# Patient Record
Sex: MALE | Race: OTHER | NOT HISPANIC OR LATINO | URBAN - METROPOLITAN AREA
[De-identification: names, ages, dates, MRNs, and addresses within clinical notes are randomized per-mention and may not be internally consistent; named-entity substitution may affect disease eponyms.]

---

## 2021-06-28 ENCOUNTER — OFFICE VISIT (OUTPATIENT)
Dept: FAMILY MEDICINE | Facility: CLINIC | Age: 46
End: 2021-06-28
Payer: COMMERCIAL

## 2021-06-28 VITALS
BODY MASS INDEX: 36.45 KG/M2 | HEIGHT: 78 IN | TEMPERATURE: 99 F | SYSTOLIC BLOOD PRESSURE: 149 MMHG | HEART RATE: 81 BPM | WEIGHT: 315 LBS | DIASTOLIC BLOOD PRESSURE: 96 MMHG | OXYGEN SATURATION: 99 %

## 2021-06-28 DIAGNOSIS — M25.561 CHRONIC PAIN OF BOTH KNEES: ICD-10-CM

## 2021-06-28 DIAGNOSIS — G47.30 SLEEP DISORDER BREATHING: ICD-10-CM

## 2021-06-28 DIAGNOSIS — M17.10 UNILATERAL PRIMARY OSTEOARTHRITIS, UNSPECIFIED KNEE: ICD-10-CM

## 2021-06-28 DIAGNOSIS — E78.5 HYPERLIPIDEMIA, UNSPECIFIED HYPERLIPIDEMIA TYPE: ICD-10-CM

## 2021-06-28 DIAGNOSIS — G89.29 CHRONIC PAIN OF BOTH KNEES: ICD-10-CM

## 2021-06-28 DIAGNOSIS — Z00.00 ROUTINE GENERAL MEDICAL EXAMINATION AT A HEALTH CARE FACILITY: ICD-10-CM

## 2021-06-28 DIAGNOSIS — Z87.01 HISTORY OF BACTERIAL PNEUMONIA: ICD-10-CM

## 2021-06-28 DIAGNOSIS — M54.9 DORSALGIA, UNSPECIFIED: ICD-10-CM

## 2021-06-28 DIAGNOSIS — M25.562 CHRONIC PAIN OF BOTH KNEES: ICD-10-CM

## 2021-06-28 DIAGNOSIS — M54.9 BACK PAIN, UNSPECIFIED BACK LOCATION, UNSPECIFIED BACK PAIN LATERALITY, UNSPECIFIED CHRONICITY: Primary | ICD-10-CM

## 2021-06-28 LAB
ABS NRBC COUNT: 0 X 10 3/UL (ref 0–0.01)
ABSOLUTE BASOPHIL: 0.02 X 10 3/UL (ref 0–0.22)
ABSOLUTE EOSINOPHIL: 0.2 X 10 3/UL (ref 0.04–0.54)
ABSOLUTE IMMATURE GRAN: 0.01 X 10 3/UL (ref 0–0.04)
ABSOLUTE LYMPHOCYTE: 1.57 X 10 3/UL (ref 0.86–4.75)
ABSOLUTE MONOCYTE: 0.45 X 10 3/UL (ref 0.22–1.08)
ALBUMIN SERPL-MCNC: 4.4 G/DL (ref 3.5–5.2)
ALP ISOS SERPL LEV INH-CCNC: 75 U/L (ref 40–130)
ALT (SGPT): 14 U/L (ref 0–41)
ANION GAP SERPL CALC-SCNC: 7 MMOL/L (ref 8–17)
AST SERPL-CCNC: 18 U/L (ref 0–40)
BASOPHILS NFR BLD: 0.4 % (ref 0.2–1.2)
BILIRUB CONJ+UNCONJ SERPL-MCNC: NORMAL MG/DL (ref 0.1–0.8)
BILIRUBIN DIRECT+TOT PNL SERPL-MCNC: <0.2 MG/DL (ref 0–0.3)
BILIRUBIN, TOTAL: 0.46 MG/DL (ref 0–1.2)
BUN/CREAT SERPL: 14.3 (ref 6–20)
CALCIUM SERPL-MCNC: 9.7 MG/DL (ref 8.6–10.2)
CARBON DIOXIDE, CO2: 29 MMOL/L (ref 22–29)
CHLORIDE: 102 MMOL/L (ref 98–107)
CHOLEST SERPL-MSCNC: 268 MG/DL (ref 100–200)
CREAT SERPL-MCNC: 0.84 MG/DL (ref 0.7–1.2)
EOSINOPHIL NFR BLD: 4.2 % (ref 0.7–7)
ESTIMATED AVERAGE GLUCOSE: 119 MG/DL
GFR ESTIMATION: 99.73
GLOBULIN: 3.6 G/DL (ref 1.5–4.5)
GLUCOSE: 98 MG/DL (ref 74–106)
HBA1C MFR BLD: 5.8 % (ref 4–6)
HCT VFR BLD AUTO: 47.5 % (ref 42–52)
HDLC SERPL-MCNC: 60 MG/DL
HGB BLD-MCNC: 14.7 G/DL (ref 14–18)
IMMATURE GRANULOCYTES: 0.2 % (ref 0–0.5)
LDL/HDL RATIO: 3.3 (ref 1–3)
LDLC SERPL CALC-MCNC: 195.4 MG/DL (ref 0–100)
LYMPHOCYTES NFR BLD: 32.8 % (ref 19.3–53.1)
MCH RBC QN AUTO: 24.6 PG (ref 27–32)
MCHC RBC AUTO-ENTMCNC: 30.9 G/DL (ref 32–36)
MCV RBC AUTO: 79.4 FL (ref 80–94)
MONOCYTES NFR BLD: 9.4 % (ref 4.7–12.5)
NEUTROPHILS # BLD AUTO: 2.53 X 10 3/UL (ref 2.15–7.56)
NEUTROPHILS NFR BLD: 53 % (ref 34–71.1)
NUCLEATED RED BLOOD CELLS: 0 /100 WBC (ref 0–0.2)
PLATELET # BLD AUTO: 248 X 10 3/UL (ref 135–400)
POTASSIUM: 4.6 MMOL/L (ref 3.5–5.1)
PROT SNV-MCNC: 8 G/DL (ref 6.4–8.3)
PSA, DIAGNOSTIC: 0.61 NG/ML (ref 0–4)
RBC # BLD AUTO: 5.98 X 10 6/UL (ref 4.7–6.1)
RDW-SD: 46.5 FL (ref 37–54)
SODIUM: 138 MMOL/L (ref 136–145)
TRIGL SERPL-MCNC: 63 MG/DL (ref 0–150)
TSH SERPL DL<=0.005 MIU/L-ACNC: 2.87 UIU/ML (ref 0.27–4.2)
UREA NITROGEN (BUN): 12 MG/DL (ref 6–20)
VITAMIN D (25OHD): 6.8 NG/ML
WBC # BLD: 4.78 X 10 3/UL (ref 4.3–10.8)

## 2021-06-28 PROCEDURE — 3008F PR BODY MASS INDEX (BMI) DOCUMENTED: ICD-10-PCS | Mod: CPTII,S$GLB,, | Performed by: INTERNAL MEDICINE

## 2021-06-28 PROCEDURE — 99204 PR OFFICE/OUTPT VISIT, NEW, LEVL IV, 45-59 MIN: ICD-10-PCS | Mod: S$GLB,,, | Performed by: INTERNAL MEDICINE

## 2021-06-28 PROCEDURE — 99204 OFFICE O/P NEW MOD 45 MIN: CPT | Mod: S$GLB,,, | Performed by: INTERNAL MEDICINE

## 2021-06-28 PROCEDURE — 3008F BODY MASS INDEX DOCD: CPT | Mod: CPTII,S$GLB,, | Performed by: INTERNAL MEDICINE

## 2021-06-29 ENCOUNTER — TELEPHONE (OUTPATIENT)
Dept: FAMILY MEDICINE | Facility: CLINIC | Age: 46
End: 2021-06-29

## 2021-07-07 ENCOUNTER — TELEPHONE (OUTPATIENT)
Dept: FAMILY MEDICINE | Facility: CLINIC | Age: 46
End: 2021-07-07

## 2021-07-08 ENCOUNTER — TELEPHONE (OUTPATIENT)
Dept: FAMILY MEDICINE | Facility: CLINIC | Age: 46
End: 2021-07-08

## 2021-12-06 ENCOUNTER — PATIENT MESSAGE (OUTPATIENT)
Dept: RESEARCH | Facility: HOSPITAL | Age: 46
End: 2021-12-06
Payer: COMMERCIAL

## 2022-01-31 ENCOUNTER — TELEPHONE (OUTPATIENT)
Dept: FAMILY MEDICINE | Facility: CLINIC | Age: 47
End: 2022-01-31
Payer: COMMERCIAL

## 2022-01-31 ENCOUNTER — PATIENT MESSAGE (OUTPATIENT)
Dept: FAMILY MEDICINE | Facility: CLINIC | Age: 47
End: 2022-01-31
Payer: COMMERCIAL

## 2022-01-31 NOTE — TELEPHONE ENCOUNTER
----- Message from Evelina Arango LPN sent at 1/27/2022  4:23 PM CST -----    ----- Message -----  From: Sandy Mancia  Sent: 1/27/2022   8:53 AM CST  To: [facility] Staff    Pt is calling in regards to x-rays from December. Pt also stated that is having some back pain. Please call [phone].

## 2022-03-10 ENCOUNTER — OFFICE VISIT (OUTPATIENT)
Dept: FAMILY MEDICINE | Facility: CLINIC | Age: 47
End: 2022-03-10
Payer: COMMERCIAL

## 2022-03-10 VITALS
OXYGEN SATURATION: 99 % | BODY MASS INDEX: 36.45 KG/M2 | WEIGHT: 315 LBS | TEMPERATURE: 98 F | HEIGHT: 78 IN | SYSTOLIC BLOOD PRESSURE: 129 MMHG | DIASTOLIC BLOOD PRESSURE: 76 MMHG | HEART RATE: 78 BPM

## 2022-03-10 DIAGNOSIS — E55.9 VITAMIN D DEFICIENCY: ICD-10-CM

## 2022-03-10 DIAGNOSIS — M25.562 CHRONIC PAIN OF BOTH KNEES: ICD-10-CM

## 2022-03-10 DIAGNOSIS — E78.5 HYPERLIPIDEMIA, UNSPECIFIED HYPERLIPIDEMIA TYPE: Primary | ICD-10-CM

## 2022-03-10 DIAGNOSIS — I42.9 CARDIOMYOPATHY, UNSPECIFIED TYPE: ICD-10-CM

## 2022-03-10 DIAGNOSIS — G89.29 CHRONIC PAIN OF BOTH KNEES: ICD-10-CM

## 2022-03-10 DIAGNOSIS — E66.01 MORBID OBESITY WITH BMI OF 40.0-44.9, ADULT: ICD-10-CM

## 2022-03-10 DIAGNOSIS — R73.03 PREDIABETES: ICD-10-CM

## 2022-03-10 DIAGNOSIS — M25.561 CHRONIC PAIN OF BOTH KNEES: ICD-10-CM

## 2022-03-10 PROCEDURE — 99214 OFFICE O/P EST MOD 30 MIN: CPT | Mod: S$GLB,,, | Performed by: INTERNAL MEDICINE

## 2022-03-10 PROCEDURE — 99214 PR OFFICE/OUTPT VISIT, EST, LEVL IV, 30-39 MIN: ICD-10-PCS | Mod: S$GLB,,, | Performed by: INTERNAL MEDICINE

## 2022-03-10 RX ORDER — NAPROXEN 500 MG/1
500 TABLET ORAL 2 TIMES DAILY
Qty: 60 TABLET | Refills: 6 | Status: SHIPPED | OUTPATIENT
Start: 2022-03-10 | End: 2023-01-01

## 2022-03-10 NOTE — PROGRESS NOTES
"Subjective:       Patient ID: Stacy Figueroa is a 46 y.o. male.    Chief Complaint: Follow-up (Chest xray), Back Pain, and Knee Pain      HPI: De comes in today for follow-up.  Still has that little dry cough.  I reviewed x-ray that he had in November of 2021 and it showed some cardiomegaly that could have either have represented edema or chronic interstitial disease.  He does not smoke and has never been a smoker.  He said that when he was a child he did have to see the cardiologist several times because he was passing out.  He said he was also told he had a murmur.  I really do not hear a murmur on today's exam.  I certainly do not hear any crackles or are problems in the bases.  His O2 sat today is 99%.  I discussed with him that I do want to schedule him for an echocardiogram and a referral to Dr. Mantilla to help sort through this.  He is going to start on some vitamin D3 3000 IU daily.  He has bilateral knee pain and more low back pain and he is used to having.  He has had no recent injuries or falls.  He says he takes over-the-counter Aleve with varying results.  I have told him I want him to start Naprosyn 500 mg twice a day faithfully with food.  I have asked him not to take over-the-counter Naprosyn or ibuprofen with that.  He denies chest pains or shortness of breath.  He knows his weight is an issue.  He does not describe ankle edema.  He denies PND or orthopnea.       Past Medical History: History reviewed. No pertinent past medical history.    Past Surgical Historical: History reviewed. No pertinent surgical history.     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    Cardiovascular: Negative for chest pain and palpitations.   Gastrointestinal: Negative for abdominal pain, change in bowel habit and change in bowel habit.   Musculoskeletal: Positive for back pain and neck pain.   Neurological: Negative for dizziness and syncope.        Physical Exam:  Physical Exam  Vitals reviewed.   Constitutional:       Appearance: Normal appearance.   Cardiovascular:      Rate and Rhythm: Normal rate and regular rhythm.   Pulmonary:      Effort: Pulmonary effort is normal.      Breath sounds: Normal breath sounds.   Abdominal:      General: Abdomen is flat.      Palpations: Abdomen is soft.   Skin:     General: Skin is warm and dry.      Comments: No Induration.    Neurological:      General: No focal deficit present.      Mental Status: He is alert and oriented to person, place, and time.   Psychiatric:         Mood and Affect: Mood normal.      Comments: Oriented.           Assessment/Plan:       Problem List Items Addressed This Visit        Cardiac/Vascular    Hyperlipidemia - Primary    Cardiomyopathy    Relevant Orders    Ambulatory referral/consult to Cardiology    Echo       Endocrine    Vitamin D deficiency    Prediabetes    Morbid obesity with BMI of 40.0-44.9, adult       Orthopedic    Chronic pain of both knees    Relevant Medications    naproxen (NAPROSYN) 500 MG tablet               Follow up in about 3 months (around 6/10/2022).     Mikhail Peterson

## 2022-03-10 NOTE — PATIENT INSTRUCTIONS
"We do not have an after hours answering service.  If you need medical assistance after hours or weekends and holidays, you will need to report to the nearest emergency room or urgent care.      If I have ordered any lab tests or imaging studies (Xray/MRI/CT Scan/ Ultrasound), you should receive a call with your results within  Seven business days.  If you do not , please call my office for results. I do not believe "No News is Good News" .    If you need prescription refills between appointments, please ask your pharmacist to send us a request to renew, as this is the most efficient way to get your medication refilled.  If needed, you can contact our office during business hours to request a renewal.  If your symptoms don't resolve, or for any change or worsening of your symptoms, please contact our office for a return visit to be re-evaluated. If you feel immediate help is needed, please dial 911 or go to the nearest emergency room.    "

## 2022-07-05 ENCOUNTER — OFFICE VISIT (OUTPATIENT)
Dept: FAMILY MEDICINE | Facility: CLINIC | Age: 47
End: 2022-07-05
Payer: COMMERCIAL

## 2022-07-05 VITALS
OXYGEN SATURATION: 96 % | SYSTOLIC BLOOD PRESSURE: 124 MMHG | HEIGHT: 78 IN | BODY MASS INDEX: 36.45 KG/M2 | HEART RATE: 90 BPM | DIASTOLIC BLOOD PRESSURE: 86 MMHG | WEIGHT: 315 LBS

## 2022-07-05 DIAGNOSIS — M25.561 CHRONIC PAIN OF BOTH KNEES: ICD-10-CM

## 2022-07-05 DIAGNOSIS — I42.9 CARDIOMYOPATHY, UNSPECIFIED TYPE: ICD-10-CM

## 2022-07-05 DIAGNOSIS — G89.29 CHRONIC PAIN OF BOTH KNEES: ICD-10-CM

## 2022-07-05 DIAGNOSIS — R73.03 PREDIABETES: ICD-10-CM

## 2022-07-05 DIAGNOSIS — R53.83 FATIGUE, UNSPECIFIED TYPE: ICD-10-CM

## 2022-07-05 DIAGNOSIS — M54.50 CHRONIC BILATERAL LOW BACK PAIN WITHOUT SCIATICA: Primary | ICD-10-CM

## 2022-07-05 DIAGNOSIS — E66.01 MORBID OBESITY WITH BMI OF 40.0-44.9, ADULT: ICD-10-CM

## 2022-07-05 DIAGNOSIS — E55.9 VITAMIN D DEFICIENCY: ICD-10-CM

## 2022-07-05 DIAGNOSIS — E78.5 HYPERLIPIDEMIA, UNSPECIFIED HYPERLIPIDEMIA TYPE: ICD-10-CM

## 2022-07-05 DIAGNOSIS — M54.16 LUMBAR RADICULOPATHY, CHRONIC: ICD-10-CM

## 2022-07-05 DIAGNOSIS — M25.562 CHRONIC PAIN OF BOTH KNEES: ICD-10-CM

## 2022-07-05 DIAGNOSIS — G89.29 CHRONIC BILATERAL LOW BACK PAIN WITHOUT SCIATICA: Primary | ICD-10-CM

## 2022-07-05 LAB
ABS NRBC COUNT: 0 X 10 3/UL (ref 0–0.01)
ABSOLUTE BASOPHIL: 0.03 X 10 3/UL (ref 0–0.22)
ABSOLUTE EOSINOPHIL: 0.2 X 10 3/UL (ref 0.04–0.54)
ABSOLUTE IMMATURE GRAN: 0.03 X 10 3/UL (ref 0–0.04)
ABSOLUTE LYMPHOCYTE: 1.65 X 10 3/UL (ref 0.86–4.75)
ABSOLUTE MONOCYTE: 0.59 X 10 3/UL (ref 0.22–1.08)
ALBUMIN SERPL-MCNC: 4.6 G/DL (ref 3.5–5.2)
ALP ISOS SERPL LEV INH-CCNC: 64 U/L (ref 40–130)
ALT (SGPT): 13 U/L (ref 0–41)
ANION GAP SERPL CALC-SCNC: 11 MMOL/L (ref 8–17)
AST SERPL-CCNC: 15 U/L (ref 0–40)
BASOPHILS NFR BLD: 0.4 % (ref 0.2–1.2)
BILIRUB CONJ+UNCONJ SERPL-MCNC: NORMAL MG/DL (ref 0.1–0.8)
BILIRUBIN DIRECT+TOT PNL SERPL-MCNC: <0.2 MG/DL (ref 0–0.3)
BILIRUBIN, TOTAL: 0.46 MG/DL (ref 0–1.2)
BUN/CREAT SERPL: 15.7 (ref 6–20)
CALCIUM SERPL-MCNC: 9.4 MG/DL (ref 8.6–10.2)
CARBON DIOXIDE, CO2: 24 MMOL/L (ref 22–29)
CHLORIDE: 102 MMOL/L (ref 98–107)
CHOLEST SERPL-MSCNC: 270 MG/DL (ref 100–200)
CREAT SERPL-MCNC: 0.88 MG/DL (ref 0.7–1.2)
EOSINOPHIL NFR BLD: 2.5 % (ref 0.7–7)
ESTIMATED AVERAGE GLUCOSE: 123 MG/DL
GFR ESTIMATION: 94.08
GLOBULIN: 3.1 G/DL (ref 1.5–4.5)
GLUCOSE: 91 MG/DL (ref 74–106)
HBA1C MFR BLD: 5.9 % (ref 4–6)
HCT VFR BLD AUTO: 45.6 % (ref 42–52)
HDLC SERPL-MCNC: 69 MG/DL
HGB BLD-MCNC: 14.6 G/DL (ref 14–18)
IMMATURE GRANULOCYTES: 0.4 % (ref 0–0.5)
LDL/HDL RATIO: 2.7 (ref 1–3)
LDLC SERPL CALC-MCNC: 189.6 MG/DL (ref 0–100)
LYMPHOCYTES NFR BLD: 20.9 % (ref 19.3–53.1)
MCH RBC QN AUTO: 24.9 PG (ref 27–32)
MCHC RBC AUTO-ENTMCNC: 32 G/DL (ref 32–36)
MCV RBC AUTO: 77.8 FL (ref 80–94)
MONOCYTES NFR BLD: 7.5 % (ref 4.7–12.5)
NEUTROPHILS # BLD AUTO: 5.39 X 10 3/UL (ref 2.15–7.56)
NEUTROPHILS NFR BLD: 68.3 % (ref 34–71.1)
NUCLEATED RED BLOOD CELLS: 0 /100 WBC (ref 0–0.2)
PLATELET # BLD AUTO: 203 X 10 3/UL (ref 135–400)
POTASSIUM: 4.5 MMOL/L (ref 3.5–5.1)
PROT SNV-MCNC: 7.7 G/DL (ref 6.4–8.3)
PSA, DIAGNOSTIC: 0.63 NG/ML (ref 0–4)
RBC # BLD AUTO: 5.86 X 10 6/UL (ref 4.7–6.1)
RDW-SD: 44.9 FL (ref 37–54)
SODIUM: 137 MMOL/L (ref 136–145)
TRIGL SERPL-MCNC: 57 MG/DL (ref 0–150)
TSH SERPL DL<=0.005 MIU/L-ACNC: 3.43 UIU/ML (ref 0.27–4.2)
UREA NITROGEN (BUN): 13.8 MG/DL (ref 6–20)
VITAMIN D (25OHD): 14.3 NG/ML
WBC # BLD: 7.89 X 10 3/UL (ref 4.3–10.8)

## 2022-07-05 PROCEDURE — 99214 OFFICE O/P EST MOD 30 MIN: CPT | Mod: S$GLB,,, | Performed by: INTERNAL MEDICINE

## 2022-07-05 PROCEDURE — 99214 PR OFFICE/OUTPT VISIT, EST, LEVL IV, 30-39 MIN: ICD-10-PCS | Mod: S$GLB,,, | Performed by: INTERNAL MEDICINE

## 2022-07-05 RX ORDER — GABAPENTIN 600 MG/1
600 TABLET ORAL 2 TIMES DAILY
Qty: 60 TABLET | Refills: 11 | Status: SHIPPED | OUTPATIENT
Start: 2022-07-05 | End: 2023-01-05

## 2022-07-05 NOTE — PROGRESS NOTES
"Subjective:       Patient ID: Stacy Figueroa is a 46 y.o. male.    Chief Complaint: Follow-up and Back Pain      HPI: Stacy comes in today for follow-up.  He is continuing to have low back pain in spite of taking the Naprosyn.  He does believe the Naprosyn has helped his knees more than his back.  We had wanted to images back but it was denied.  We are going to try to order it again since he is continuing to have problems I think he has a little bit of muscle weakness in his legs.  He knows that his weight is an issue.  Fortunately he does not smoke.  He did see the cardiologist at my request.  I think that this was primarily based on cardiomegaly on a chest x-ray that he had in February of this year.  He says that that a check a point well and his heart doctor told him nothing to worry about.  We are going to repeat blood work.  He will continue to work on weight loss.       Past Medical History: History reviewed. No pertinent past medical history.    Past Surgical Historical: History reviewed. No pertinent surgical history.     Vitals: /86 (BP Location: Left arm, Patient Position: Sitting, BP Method: Large (Manual))   Pulse 90   Ht 6' 6" (1.981 m)   Wt (!) 178.3 kg (393 lb 2 oz)   SpO2 96%   BMI 45.43 kg/m²      Medications:   Medication List with Changes/Refills   New Medications    GABAPENTIN (NEURONTIN) 600 MG TABLET    Take 1 tablet (600 mg total) by mouth 2 (two) times daily.   Current Medications    NAPROXEN (NAPROSYN) 500 MG TABLET    Take 1 tablet (500 mg total) by mouth 2 (two) times daily.        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    Cardiovascular: Negative for chest pain and palpitations.   Gastrointestinal: Negative for abdominal pain, change in bowel habit and change in bowel habit.   Neurological: Negative for dizziness and syncope.        Physical Exam:  Physical Exam  Vitals reviewed.   Constitutional:       Appearance: Normal appearance. He is obese.   Cardiovascular:      Rate and Rhythm: Normal rate and regular rhythm.   Pulmonary:      Effort: Pulmonary effort is normal.      Breath sounds: Normal breath sounds.   Abdominal:      General: Abdomen is flat.      Palpations: Abdomen is soft.   Skin:     General: Skin is warm and dry.      Comments: No Induration.    Neurological:      General: No focal deficit present.      Mental Status: He is alert and oriented to person, place, and time.   Psychiatric:         Mood and Affect: Mood normal.      Comments: Oriented.           Labs:  No visits with results within 6 Month(s) from this visit.   Latest known visit with results is:   Office Visit on 06/28/2021   Component Date Value Ref Range Status    PSA Diagnostic 06/28/2021 0.611  0.00 - 4.00 ng/mL Final    Glucose 06/28/2021 98  74 - 106 mg/dL Final    BUN 06/28/2021 12.0  6 - 20 mg/dL Final    Creatinine 06/28/2021 0.84  0.70 - 1.20 mg/dL Final    Calcium 06/28/2021 9.7  8.6 - 10.2 mg/dL Final    Sodium 06/28/2021 138  136 - 145 mmol/L Final    Potassium 06/28/2021 4.6  3.5 - 5.1 mmol/L Final    Chloride 06/28/2021 102  98 - 107 mmol/L Final    CO2 06/28/2021 29  22 - 29 mmol/L Final    BUN/Creatinine Ratio 06/28/2021 14.3  6 - 20 Final    GFR ESTIMATION 06/28/2021 99.73  >60.00 Final    Anion Gap 06/28/2021 7.0 (A) 8.0 - 17.0 mmol/L Final    WBC 06/28/2021  4.78  4.3 - 10.8 X 10 3/ul Final    RBC 06/28/2021 5.98  4.7 - 6.1 X 10 6/ul Final    RDW-SD 06/28/2021 46.5  37 - 54 fl Final    Hemoglobin 06/28/2021 14.7  14 - 18 g/dL Final    Hematocrit 06/28/2021 47.5  42 - 52 % Final    MCV 06/28/2021 79.4 (A) 80 - 94 fl Final    MCH 06/28/2021 24.6 (A) 27 - 32 pg Final    MCHC 06/28/2021 30.9 (A) 32 - 36 g/dL Final    Platelets 06/28/2021 248  135 - 400 X 10 3/ul Final    Neutrophils 06/28/2021 53.0  34 - 71.1 % Final    Lymphocytes 06/28/2021 32.8  19.3 - 53.1 % Final    Monocytes 06/28/2021 9.4  4.7 - 12.5 % Final    Eosinophils 06/28/2021 4.2  0.7 - 7.0 % Final    Basophils 06/28/2021 0.4  0.2 - 1.2 % Final    Neutrophils Absolute 06/28/2021 2.53  2.15 - 7.56 X 10 3/ul Final    Lymphocytes Absolute 06/28/2021 1.57  0.86 - 4.75 X 10 3/ul Final    Monocytes Absolute 06/28/2021 0.45  0.22 - 1.08 X 10 3/ul Final    Eosinophils Absolute 06/28/2021 0.20  0.04 - 0.54 X 10 3/ul Final    Basophils Absolute 06/28/2021 0.02  0.00 - 0.22 X 10 3/ul Final    Immature Granulocytes Absolute 06/28/2021 0.01  0 - 0.04 X 10 3/ul Final    Immature Granulocytes 06/28/2021 0.2  0 - 0.5 % Final    nRBC# 06/28/2021 0.0  0 - 0.2 /100 WBC Final    nRBC Count Absolute 06/28/2021 0.000  0 - 0.012 x 10 3/ul Final    Hemoglobin A1C 06/28/2021 5.8  4.0 - 6.0 % Final    EST AVERAGE GLUCOSE 06/28/2021 119 (A) NORMAL MG/DL Final    AST 06/28/2021 18  0 - 40 U/L Final    ALT (SGPT) 06/28/2021 14  0 - 41 U/L Final    Alkaline Phosphatase 06/28/2021 75  40 - 130 U/L Final    Protein, Total 06/28/2021 8.0  6.4 - 8.3 g/dL Final    Albumin 06/28/2021 4.4  3.5 - 5.2 g/dL Final    BILIRUBIN, TOTAL 06/28/2021 0.46  0.00 - 1.20 mg/dL Final    Bilirubin, Direct 06/28/2021 <0.20  0.00 - 0.30 mg/dL Final    Globulin 06/28/2021 3.6  1.5 - 4.5 g/dL Final    Bilirubin, Indirect 06/28/2021 Unable to Calculate  0.10 - 0.80 mg/dL Final    TSH 06/28/2021 2.87  0.27 - 4.20 uIU/mL Final     Cholesterol 06/28/2021 268 (A) 100 - 200 mg/dL Final    Triglycerides 06/28/2021 63  0 - 150 mg/dL Final    HDL 06/28/2021 60  >60 mg/dL Final    LDL Cholesterol 06/28/2021 195.4 (A) 0 - 100 mg/dL Final    LDL/HDL Ratio 06/28/2021 3.3 (A) 1 - 3 Final    VITAMIN D (25OHD) 06/28/2021 6.80 (A) >30 ng/mL Final        Assessment/Plan:       Problem List Items Addressed This Visit        Neuro    Lumbar radiculopathy, chronic    Relevant Orders    MRI Lumbar Spine Without Contrast       Cardiac/Vascular    Hyperlipidemia    Relevant Orders    Lipid Panel    Cardiomyopathy       Endocrine    Vitamin D deficiency    Relevant Orders    Vitamin D    Prediabetes    Relevant Orders    Hemoglobin A1C    Morbid obesity with BMI of 40.0-44.9, adult       Orthopedic    Chronic pain of both knees    Chronic bilateral low back pain without sciatica - Primary    Relevant Medications    gabapentin (NEURONTIN) 600 MG tablet      Other Visit Diagnoses     Fatigue, unspecified type        Relevant Orders    Basic Metabolic Panel    CBC Auto Differential    Hepatic Function Panel    TSH    Prostate Specific Antigen, Diagnostic               Follow up in about 6 months (around 1/5/2023).     Mikhail Peterson

## 2022-07-06 ENCOUNTER — TELEPHONE (OUTPATIENT)
Dept: FAMILY MEDICINE | Facility: CLINIC | Age: 47
End: 2022-07-06
Payer: COMMERCIAL

## 2022-07-06 NOTE — TELEPHONE ENCOUNTER
Informed patient of his Vitamin levels and that he needs to increase his current dose of vitamin D. Patient verbalized understanding.

## 2022-07-06 NOTE — TELEPHONE ENCOUNTER
----- Message from Mikhail Peterson MD sent at 7/5/2022  4:53 PM CDT -----  Pre diabetes cholesterol and triglycerides are elevated vitamin-D is low what ever vitamin-D he is taking he needs to double it.

## 2022-12-30 ENCOUNTER — PATIENT MESSAGE (OUTPATIENT)
Dept: ADMINISTRATIVE | Facility: HOSPITAL | Age: 47
End: 2022-12-30
Payer: COMMERCIAL

## 2023-01-01 ENCOUNTER — OFFICE VISIT (OUTPATIENT)
Dept: FAMILY MEDICINE | Facility: CLINIC | Age: 48
End: 2023-01-01
Payer: COMMERCIAL

## 2023-01-01 ENCOUNTER — TELEPHONE (OUTPATIENT)
Dept: FAMILY MEDICINE | Facility: CLINIC | Age: 48
End: 2023-01-01
Payer: COMMERCIAL

## 2023-01-01 VITALS
OXYGEN SATURATION: 98 % | DIASTOLIC BLOOD PRESSURE: 86 MMHG | TEMPERATURE: 97 F | HEIGHT: 78 IN | RESPIRATION RATE: 15 BRPM | WEIGHT: 315 LBS | SYSTOLIC BLOOD PRESSURE: 130 MMHG | BODY MASS INDEX: 36.45 KG/M2 | HEART RATE: 110 BPM

## 2023-01-01 DIAGNOSIS — E78.5 HYPERLIPIDEMIA, UNSPECIFIED HYPERLIPIDEMIA TYPE: ICD-10-CM

## 2023-01-01 DIAGNOSIS — G89.29 CHRONIC BILATERAL LOW BACK PAIN WITH RIGHT-SIDED SCIATICA: ICD-10-CM

## 2023-01-01 DIAGNOSIS — M54.41 CHRONIC BILATERAL LOW BACK PAIN WITH RIGHT-SIDED SCIATICA: ICD-10-CM

## 2023-01-01 DIAGNOSIS — R73.03 PREDIABETES: ICD-10-CM

## 2023-01-01 DIAGNOSIS — E55.9 VITAMIN D DEFICIENCY: ICD-10-CM

## 2023-01-01 DIAGNOSIS — Z76.89 ENCOUNTER TO ESTABLISH CARE: Primary | ICD-10-CM

## 2023-01-01 DIAGNOSIS — Z12.11 COLON CANCER SCREENING: ICD-10-CM

## 2023-01-01 LAB
25(OH)D3 SERPL-MCNC: 27 NG/ML
ABS NRBC COUNT: 0 THOU/UL (ref 0–0.01)
ABSOLUTE BASOPHIL: 0 10*3/UL (ref 0–0.3)
ABSOLUTE EOSINOPHIL: 0.3 10*3/UL (ref 0–0.6)
ABSOLUTE IMMATURE GRAN: 0.03 THOU/UL (ref 0–0.03)
ABSOLUTE LYMPHOCYTE: 1.9 10*3/UL (ref 1.2–4)
ABSOLUTE MONOCYTE: 0.5 10*3/UL (ref 0.1–0.8)
ALBUMIN SERPL BCP-MCNC: 4 G/DL (ref 3.4–5)
ALP SERPL-CCNC: 64 U/L (ref 45–117)
ALT SERPL W P-5'-P-CCNC: 26 U/L (ref 16–61)
ANION GAP SERPL CALC-SCNC: 5 MMOL/L (ref 3–11)
AST SERPL-CCNC: 19 U/L (ref 15–37)
BASOPHILS NFR BLD: 0.5 % (ref 0–3)
BILIRUB SERPL-MCNC: 1 MG/DL (ref 0.2–1)
BUN SERPL-MCNC: 15 MG/DL (ref 7–18)
BUN/CREAT SERPL: 13.39 RATIO
CALCIUM SERPL-MCNC: 9.3 MG/DL (ref 8.5–10.1)
CHLORIDE SERPL-SCNC: 104 MMOL/L (ref 98–107)
CHOLEST SERPL-MSCNC: 262 MG/DL
CO2 SERPL-SCNC: 26 MMOL/L (ref 21–32)
CREAT SERPL-MCNC: 1.12 MG/DL (ref 0.7–1.3)
EOSINOPHIL NFR BLD: 5.5 % (ref 0–6)
ERYTHROCYTE [DISTWIDTH] IN BLOOD BY AUTOMATED COUNT: 16.9 % (ref 0–15.5)
ESTIMATED AVG GLUCOSE: 136 MG/DL
GFR ESTIMATION: > 60
GLUCOSE SERPL-MCNC: 91 MG/DL (ref 74–106)
HBA1C MFR BLD: 6 % (ref 4.2–6.3)
HCT VFR BLD AUTO: 44.6 % (ref 42–52)
HDLC SERPL-MCNC: 68 MG/DL
HGB BLD-MCNC: 13.3 G/DL (ref 14–18)
IMMATURE GRANULOCYTES: 0.5 % (ref 0–0.43)
LDLC SERPL CALC-MCNC: 185.2 MG/DL
LYMPHOCYTES NFR BLD: 33.9 % (ref 20–45)
MCH RBC QN AUTO: 23.4 PG (ref 27–32)
MCHC RBC AUTO-ENTMCNC: 29.8 % (ref 32–36)
MCV RBC AUTO: 78.4 FL (ref 80–97)
MONOCYTES NFR BLD: 8 % (ref 2–10)
NEUTROPHILS # BLD AUTO: 2.9 10*3/UL (ref 1.4–7)
NEUTROPHILS NFR BLD: 51.6 % (ref 50–80)
NUCLEATED RED BLOOD CELLS: 0 % (ref 0–0.2)
PLATELETS: 221 10*3/UL (ref 130–400)
PMV BLD AUTO: 11.5 FL (ref 9.2–12.2)
POTASSIUM SERPL-SCNC: 4.2 MMOL/L (ref 3.5–5.1)
PROT SERPL-MCNC: 9.1 G/DL (ref 6.4–8.2)
PSA: 0.7 NG/ML (ref 0.1–4)
RBC # BLD AUTO: 5.69 10*6/UL (ref 4.7–6.1)
SODIUM BLD-SCNC: 135 MMOL/L (ref 131–143)
T4 FREE SP9 P CHAL SERPL-SCNC: 0.85 NG/DL (ref 0.76–1.46)
TRIGL SERPL-MCNC: 44 MG/DL (ref 0–149)
TSH SERPL DL<=0.005 MIU/L-ACNC: 2.45 UIU/ML (ref 0.36–3.74)
VLDL CHOLESTEROL: 9 MG/DL
WBC # BLD: 5.6 10*3/UL (ref 4.5–10)

## 2023-01-01 PROCEDURE — 99396 PR PREVENTIVE VISIT,EST,40-64: ICD-10-PCS | Mod: S$GLB,,, | Performed by: INTERNAL MEDICINE

## 2023-01-01 PROCEDURE — 3079F PR MOST RECENT DIASTOLIC BLOOD PRESSURE 80-89 MM HG: ICD-10-PCS | Mod: CPTII,S$GLB,, | Performed by: INTERNAL MEDICINE

## 2023-01-01 PROCEDURE — 3075F SYST BP GE 130 - 139MM HG: CPT | Mod: CPTII,S$GLB,, | Performed by: INTERNAL MEDICINE

## 2023-01-01 PROCEDURE — 3079F DIAST BP 80-89 MM HG: CPT | Mod: CPTII,S$GLB,, | Performed by: INTERNAL MEDICINE

## 2023-01-01 PROCEDURE — 1159F PR MEDICATION LIST DOCUMENTED IN MEDICAL RECORD: ICD-10-PCS | Mod: CPTII,S$GLB,, | Performed by: INTERNAL MEDICINE

## 2023-01-01 PROCEDURE — 1160F RVW MEDS BY RX/DR IN RCRD: CPT | Mod: CPTII,S$GLB,, | Performed by: INTERNAL MEDICINE

## 2023-01-01 PROCEDURE — 99396 PREV VISIT EST AGE 40-64: CPT | Mod: S$GLB,,, | Performed by: INTERNAL MEDICINE

## 2023-01-01 PROCEDURE — 3008F BODY MASS INDEX DOCD: CPT | Mod: CPTII,S$GLB,, | Performed by: INTERNAL MEDICINE

## 2023-01-01 PROCEDURE — 1160F PR REVIEW ALL MEDS BY PRESCRIBER/CLIN PHARMACIST DOCUMENTED: ICD-10-PCS | Mod: CPTII,S$GLB,, | Performed by: INTERNAL MEDICINE

## 2023-01-01 PROCEDURE — 3008F PR BODY MASS INDEX (BMI) DOCUMENTED: ICD-10-PCS | Mod: CPTII,S$GLB,, | Performed by: INTERNAL MEDICINE

## 2023-01-01 PROCEDURE — 1159F MED LIST DOCD IN RCRD: CPT | Mod: CPTII,S$GLB,, | Performed by: INTERNAL MEDICINE

## 2023-01-01 PROCEDURE — 3075F PR MOST RECENT SYSTOLIC BLOOD PRESS GE 130-139MM HG: ICD-10-PCS | Mod: CPTII,S$GLB,, | Performed by: INTERNAL MEDICINE

## 2023-01-01 RX ORDER — GABAPENTIN 600 MG/1
600 TABLET ORAL 2 TIMES DAILY PRN
Qty: 60 TABLET | Refills: 3 | Status: SHIPPED | OUTPATIENT
Start: 2023-01-01 | End: 2024-01-01 | Stop reason: SDUPTHER

## 2023-01-05 ENCOUNTER — OFFICE VISIT (OUTPATIENT)
Dept: FAMILY MEDICINE | Facility: CLINIC | Age: 48
End: 2023-01-05
Payer: COMMERCIAL

## 2023-01-05 VITALS
HEIGHT: 78 IN | WEIGHT: 315 LBS | TEMPERATURE: 98 F | BODY MASS INDEX: 36.45 KG/M2 | HEART RATE: 68 BPM | OXYGEN SATURATION: 98 %

## 2023-01-05 DIAGNOSIS — E66.01 MORBID OBESITY WITH BMI OF 40.0-44.9, ADULT: ICD-10-CM

## 2023-01-05 DIAGNOSIS — I42.9 CARDIOMYOPATHY, UNSPECIFIED TYPE: ICD-10-CM

## 2023-01-05 DIAGNOSIS — R05.1 ACUTE COUGH: ICD-10-CM

## 2023-01-05 DIAGNOSIS — R05.1 ACUTE COUGH: Primary | ICD-10-CM

## 2023-01-05 DIAGNOSIS — N52.9 ERECTILE DYSFUNCTION, UNSPECIFIED ERECTILE DYSFUNCTION TYPE: ICD-10-CM

## 2023-01-05 PROCEDURE — 3008F BODY MASS INDEX DOCD: CPT | Mod: CPTII,S$GLB,, | Performed by: INTERNAL MEDICINE

## 2023-01-05 PROCEDURE — 99214 OFFICE O/P EST MOD 30 MIN: CPT | Mod: S$GLB,,, | Performed by: INTERNAL MEDICINE

## 2023-01-05 PROCEDURE — 3008F PR BODY MASS INDEX (BMI) DOCUMENTED: ICD-10-PCS | Mod: CPTII,S$GLB,, | Performed by: INTERNAL MEDICINE

## 2023-01-05 PROCEDURE — 1159F PR MEDICATION LIST DOCUMENTED IN MEDICAL RECORD: ICD-10-PCS | Mod: CPTII,S$GLB,, | Performed by: INTERNAL MEDICINE

## 2023-01-05 PROCEDURE — 1160F PR REVIEW ALL MEDS BY PRESCRIBER/CLIN PHARMACIST DOCUMENTED: ICD-10-PCS | Mod: CPTII,S$GLB,, | Performed by: INTERNAL MEDICINE

## 2023-01-05 PROCEDURE — 1159F MED LIST DOCD IN RCRD: CPT | Mod: CPTII,S$GLB,, | Performed by: INTERNAL MEDICINE

## 2023-01-05 PROCEDURE — 99214 PR OFFICE/OUTPT VISIT, EST, LEVL IV, 30-39 MIN: ICD-10-PCS | Mod: S$GLB,,, | Performed by: INTERNAL MEDICINE

## 2023-01-05 PROCEDURE — 1160F RVW MEDS BY RX/DR IN RCRD: CPT | Mod: CPTII,S$GLB,, | Performed by: INTERNAL MEDICINE

## 2023-01-05 RX ORDER — OSELTAMIVIR PHOSPHATE 75 MG/1
75 CAPSULE ORAL DAILY
COMMUNITY
Start: 2022-12-31 | End: 2023-01-01

## 2023-01-05 RX ORDER — PROMETHAZINE HYDROCHLORIDE, PHENYLEPHRINE HYDROCHLORIDE AND CODEINE PHOSPHATE 6.25; 5; 1 MG/5ML; MG/5ML; MG/5ML
5 SOLUTION ORAL 4 TIMES DAILY
Qty: 180 ML | Refills: 0 | Status: SHIPPED | OUTPATIENT
Start: 2023-01-05 | End: 2023-01-09 | Stop reason: SDUPTHER

## 2023-01-05 RX ORDER — ALBUTEROL SULFATE 1.25 MG/3ML
1.25 SOLUTION RESPIRATORY (INHALATION)
Status: DISCONTINUED | OUTPATIENT
Start: 2023-01-05 | End: 2023-01-01

## 2023-01-05 NOTE — PROGRESS NOTES
"Subjective:       Patient ID: Stacy Figueroa is a 47 y.o. male.    Chief Complaint: Follow-up and Cough      HPI: Stacy comes in today for an appointment.  He started 1 week ago with sore throat and cough.  He has headaches and muscle aches.  He is had some fever.  He is had some diarrhea they did a COVID test a proximally 2 days into the symptoms and it was negative.  They had a family member that called out Tamiflu for him and he is almost completed that.  We are going to retest for COVID while he is here and get him a respiratory treatment.  We will also give him some to treat his cough.  I do not think antibiotics are going to help since his mucus is white.  He did not feel whole lot different after the respiratory treatment and he is got good flow of air so were not going to give him an inhaler at this time.  He had some concerns about the gabapentin possibly causing ED.  I told him that I think it is reasonable for him to give it a try, and that if it does cause some problems he can stop the gabapentin and let us know.  Will make a decision on this.  He is getting some benefit from the Naprosyn.       Past Medical History: History reviewed. No pertinent past medical history.    Past Surgical Historical: History reviewed. No pertinent surgical history.     Vitals: Pulse 68   Temp 97.9 °F (36.6 °C) (Temporal)   Ht 6' 6" (1.981 m)   Wt (!) 183.3 kg (404 lb)   SpO2 98%   BMI 46.69 kg/m²      Medications:   Medication List with Changes/Refills   New Medications    PHENYLEPH-PROMETHAZINE-COD 5-6.25-10 MG/5 ML (PROMETHAZINE VC-CODEINE) 6.25-5-10 MG/5 ML SYRP    Take 5 mLs by mouth 4 (four) times daily. As needed for cough for 10 days   Current Medications    NAPROXEN (NAPROSYN) 500 MG TABLET    Take 1 tablet (500 mg total) by mouth 2 (two) times daily.    OSELTAMIVIR (TAMIFLU) 75 MG CAPSULE    Take 75 mg by mouth once daily.   Discontinued Medications    GABAPENTIN (NEURONTIN) 600 MG TABLET    Take 1 tablet " (600 mg total) by mouth 2 (two) times daily.        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    Cardiovascular:  Negative for chest pain and palpitations.   Gastrointestinal:  Negative for abdominal pain, change in bowel habit and change in bowel habit.   Neurological:  Negative for dizziness and syncope.      Physical Exam:  Physical Exam  Vitals reviewed.   Constitutional:       Appearance: Normal appearance.   Cardiovascular:      Rate and Rhythm: Normal rate and regular rhythm.   Pulmonary:      Effort: Pulmonary effort is normal. No respiratory distress.      Breath sounds: No stridor. Wheezing present. No rhonchi or rales.   Abdominal:      General: Abdomen is flat.      Palpations: Abdomen is soft.   Skin:     General: Skin is warm and dry.      Comments: No Induration.    Neurological:      General: No focal deficit present.      Mental Status: He is alert and oriented to person, place, and time.   Psychiatric:         Mood and Affect: Mood normal.      Comments: Oriented.         Labs:  No visits with results within 6 Month(s) from this visit.   Latest known visit with results is:   Office Visit on 07/05/2022   Component Date Value Ref Range Status    Glucose 07/05/2022 91  74 - 106 mg/dL Final    BUN 07/05/2022 13.8  6 - 20 mg/dL Final    Creatinine 07/05/2022 0.88  0.70 - 1.20 mg/dL Final    Calcium 07/05/2022 9.4  8.6 -  10.2 mg/dL Final    Sodium 07/05/2022 137  136 - 145 mmol/L Final    Potassium 07/05/2022 4.5  3.5 - 5.1 mmol/L Final    Chloride 07/05/2022 102  98 - 107 mmol/L Final    CO2 07/05/2022 24  22 - 29 mmol/L Final    BUN/Creatinine Ratio 07/05/2022 15.7  6 - 20 Final    GFR ESTIMATION 07/05/2022 94.08  >60.00 Final    Anion Gap 07/05/2022 11.0  8.0 - 17.0 mmol/L Final    WBC 07/05/2022 7.89  4.3 - 10.8 X 10 3/ul Final    RBC 07/05/2022 5.86  4.7 - 6.1 X 10 6/ul Final    RDW-SD 07/05/2022 44.9  37 - 54 fl Final    Hemoglobin 07/05/2022 14.6  14 - 18 g/dL Final    Hematocrit 07/05/2022 45.6  42 - 52 % Final    MCV 07/05/2022 77.8 (L)  80 - 94 fl Final    MCH 07/05/2022 24.9 (L)  27 - 32 pg Final    MCHC 07/05/2022 32.0  32 - 36 g/dL Final    Platelets 07/05/2022 203  135 - 400 X 10 3/ul Final    Neutrophils 07/05/2022 68.3  34 - 71.1 % Final    Lymphocytes 07/05/2022 20.9  19.3 - 53.1 % Final    Monocytes 07/05/2022 7.5  4.7 - 12.5 % Final    Eosinophils 07/05/2022 2.5  0.7 - 7.0 % Final    Basophils 07/05/2022 0.4  0.2 - 1.2 % Final    Neutrophils Absolute 07/05/2022 5.39  2.15 - 7.56 X 10 3/ul Final    Lymphocytes Absolute 07/05/2022 1.65  0.86 - 4.75 X 10 3/ul Final    Monocytes Absolute 07/05/2022 0.59  0.22 - 1.08 X 10 3/ul Final    Eosinophils Absolute 07/05/2022 0.20  0.04 - 0.54 X 10 3/ul Final    Basophils Absolute 07/05/2022 0.03  0.00 - 0.22 X 10 3/ul Final    Immature Granulocytes Absolute 07/05/2022 0.03  0 - 0.04 X 10 3/ul Final    Immature Granulocytes 07/05/2022 0.4  0 - 0.5 % Final    nRBC# 07/05/2022 0.0  0 - 0.2 /100 WBC Final    nRBC Count Absolute 07/05/2022 0.000  0 - 0.012 x 10 3/ul Final    Hemoglobin A1C 07/05/2022 5.9  4.0 - 6.0 % Final    EST AVERAGE GLUCOSE 07/05/2022 123 (H)  NORMAL MG/DL Final    AST 07/05/2022 15  0 - 40 U/L Final    ALT (SGPT) 07/05/2022 13  0 - 41 U/L Final    Alkaline Phosphatase 07/05/2022 64  40 - 130 U/L Final    Protein, Total 07/05/2022 7.7  6.4 - 8.3 g/dL Final     Albumin 07/05/2022 4.6  3.5 - 5.2 g/dL Final    BILIRUBIN, TOTAL 07/05/2022 0.46  0.00 - 1.20 mg/dL Final    Bilirubin, Direct 07/05/2022 <0.20  0.00 - 0.30 mg/dL Final    Globulin 07/05/2022 3.1  1.5 - 4.5 g/dL Final    Bilirubin, Indirect 07/05/2022 Unable to Calculate  0.10 - 0.80 mg/dL Final    TSH 07/05/2022 3.43  0.27 - 4.20 uIU/mL Final    Cholesterol 07/05/2022 270 (H)  100 - 200 mg/dL Final    Triglycerides 07/05/2022 57  0 - 150 mg/dL Final    HDL 07/05/2022 69  >60 mg/dL Final    LDL Cholesterol 07/05/2022 189.6 (H)  0 - 100 mg/dL Final    LDL/HDL Ratio 07/05/2022 2.7  1 - 3 Final    VITAMIN D (25OHD) 07/05/2022 14.3 (L)  >30 ng/mL Final    PSA Diagnostic 07/05/2022 0.628  0.00 - 4.00 ng/mL Final        Assessment/Plan:       Problem List Items Addressed This Visit          Cardiac/Vascular    Cardiomyopathy       Endocrine    Morbid obesity with BMI of 40.0-44.9, adult     Other Visit Diagnoses       Acute cough    -  Primary    Relevant Medications    albuterol nebulizer solution 1.25 mg (Start on 1/5/2023  3:15 PM)    phenyleph-promethazine-cod 5-6.25-10 mg/5 ml (PROMETHAZINE VC-CODEINE) 6.25-5-10 mg/5 mL Syrp    Erectile dysfunction, unspecified erectile dysfunction type                     Follow up in about 6 months (around 7/5/2023).     Mikhail Peterson

## 2023-01-06 RX ORDER — PHENYLEPHRINE HCL/PROMETH HCL 5-6.25MG/5
SYRUP ORAL
Qty: 180 ML | Refills: 0 | OUTPATIENT
Start: 2023-01-06

## 2023-01-09 DIAGNOSIS — R05.1 ACUTE COUGH: ICD-10-CM

## 2023-01-09 RX ORDER — PROMETHAZINE HYDROCHLORIDE, PHENYLEPHRINE HYDROCHLORIDE AND CODEINE PHOSPHATE 6.25; 5; 1 MG/5ML; MG/5ML; MG/5ML
5 SOLUTION ORAL 4 TIMES DAILY
Qty: 180 ML | Refills: 0 | Status: SHIPPED | OUTPATIENT
Start: 2023-01-09 | End: 2023-01-19

## 2023-01-09 NOTE — TELEPHONE ENCOUNTER
----- Message from Shruthi Resendez sent at 1/9/2023 12:16 PM CST -----  Regarding: Medication  Contact: patient  .Type:  RX Refill Request    Who Called: Travrd  Refill or New Rx:Refill  RX Name and Strength:phenyleph-promethazine-cod 5-6.25-10 mg/5 ml (PROMETHAZINE VC-CODEINE) 6.25-5-10 mg/5 mL Syrp  How is the patient currently taking it? (ex. 1XDay):as needed  Is this a 30 day or 90 day RX:n/a  Preferred Pharmacy with phone number:    [id number] - [place], [place] - [address] [place] [address] [id number] [facility] [id number]  Phone: [phone] Fax: [phone]      Local or Mail Order:local  Ordering Provider:Mikhail Peterson  Would the patient rather a call back or a response via [facility]? My [facility]  Best Call Back Number:[phone]    Additional Information: The medication was not available at the pharmacy it was sent to, needs location changed.

## 2023-07-05 PROBLEM — M54.41 CHRONIC BILATERAL LOW BACK PAIN WITH RIGHT-SIDED SCIATICA: Status: ACTIVE | Noted: 2022-07-05

## 2023-07-05 NOTE — PROGRESS NOTES
"  Subjective:       Patient ID: Stacy Figueroa is a 47 y.o. male.    Chief Complaint: Establish Care (Former pt. Of Dr. Parra to get est.  C/o back pain and Rt. leg pain between back and knee x 1.5yrs.  Dr. Parra dx it as "arthritis" per pt.  )    HPI    47 y.o. male here to establish care.         Health Maintenance Topics with due status: Not Due       Topic Last Completion Date    Lipid Panel 07/05/2022    Influenza Vaccine Not Due       Health Maintenance Due   Topic Date Due    Hepatitis C Screening  Never done    Pneumococcal Vaccines (Age 0-64) (1 - PCV) Never done    HIV Screening  Never done    TETANUS VACCINE  09/03/2022    COVID-19 Vaccine (4 - Pfizer series) 09/29/2022    Colorectal Cancer Screening  Never done    Hemoglobin A1c (Prediabetes)  07/05/2023         Medical Problems:      Patient Active Problem List   Diagnosis    Vitamin D deficiency    Hyperlipidemia    Prediabetes    Morbid obesity with BMI of 40.0-44.9, adult    Cardiomyopathy    Chronic pain of both knees    Lumbar radiculopathy, chronic    Chronic bilateral low back pain with right-sided sciatica       Current Outpatient Medications on File Prior to Visit   Medication Sig Dispense Refill    [DISCONTINUED] naproxen (NAPROSYN) 500 MG tablet Take 1 tablet (500 mg total) by mouth 2 (two) times daily. (Patient not taking: Reported on 7/5/2023) 60 tablet 6    [DISCONTINUED] oseltamivir (TAMIFLU) 75 MG capsule Take 75 mg by mouth once daily.       Current Facility-Administered Medications on File Prior to Visit   Medication Dose Route Frequency Provider Last Rate Last Admin    [DISCONTINUED] albuterol nebulizer solution 1.25 mg  1.25 mg Nebulization 1 time in Clinic/HOD Mikhail Peterson MD               History reviewed. No pertinent past medical history.  History reviewed. No pertinent surgical history.  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        Review of Systems   Constitutional:  Negative for diaphoresis and fever.   HENT:  Negative for trouble swallowing.    Eyes:  Negative for discharge.   Respiratory:  Negative for cough and shortness of breath.    Cardiovascular:  Negative for chest pain and leg swelling.   Gastrointestinal:  Negative for abdominal pain and blood in stool.   Genitourinary:  Negative for difficulty urinating and dysuria.   Musculoskeletal:  Positive for arthralgias and back pain. Negative for gait problem.   Skin:  Negative for pallor.   Neurological:  Negative for syncope and weakness.     Objective:        Vitals:    07/05/23 1337   BP: 130/86   BP Location: Left arm   Patient Position: Sitting   BP Method: Large (Manual)   Pulse: 110   Resp: 15   Temp: 97.3 °F (36.3 °C)   TempSrc: Temporal   SpO2: 98%   Weight: (!) 178.4 kg (393 lb 3.2 oz)   Height: 6' 6" (1.981 m)       Body mass index is 45.44 kg/m².    Physical Exam  Constitutional:       General: He is not in acute distress.     Appearance: He is well-developed. He is not diaphoretic.   HENT:      Head: Normocephalic and atraumatic.      Right Ear: External ear normal.      Left Ear: External ear normal.   Eyes:      Conjunctiva/sclera: Conjunctivae normal.   Cardiovascular:      Rate and Rhythm: Normal rate and regular rhythm. "   Pulmonary:      Effort: Pulmonary effort is normal.      Breath sounds: Normal breath sounds.   Abdominal:      General: Bowel sounds are normal.      Palpations: Abdomen is soft.   Musculoskeletal:      Cervical back: Neck supple.      Right lower leg: No edema.      Left lower leg: No edema.   Skin:     General: Skin is warm and dry.      Nails: There is no clubbing.   Neurological:      General: No focal deficit present.      Mental Status: He is alert.      Gait: Gait normal.   Psychiatric:         Behavior: Behavior normal.         Judgment: Judgment normal.       Assessment:     1. Encounter to establish care    2. Colon cancer screening    3. Prediabetes    4. Hyperlipidemia, unspecified hyperlipidemia type    5. Vitamin D deficiency    6. Chronic bilateral low back pain with right-sided sciatica           Plan:         1. Encounter to establish care  - reviewed healthcare maintenance and pt's chronic medical problems.   - labs reviewed, ordered  - immunizations reviewed  - CRC screen - due   - CBC Auto Differential  - Comprehensive Metabolic Panel  - Hemoglobin A1C  - Lipid Panel  - TSH  - T4, Free  - Vitamin D  - PSA, Screening    2. Colon cancer screening    - Cologuard Screening (Multitarget Stool DNA); Future  - Cologuard Screening (Multitarget Stool DNA)    3. Prediabetes    - Hemoglobin A1C    4. Hyperlipidemia, unspecified hyperlipidemia type    - Lipid Panel    5. Vitamin D deficiency    - Vitamin D    6. Chronic bilateral low back pain with right-sided sciatica  - refill prn med which works well for him without adverse effect  - gabapentin (NEURONTIN) 600 MG tablet; Take 1 tablet (600 mg total) by mouth 2 (two) times daily as needed (pain).  Dispense: 60 tablet; Refill: 3                Unless there are intervening problems, Follow up in about 6 months (around 1/5/2024), or if symptoms worsen or fail to improve, for follow up.      Patient note was created using MModal Dictation.  Any errors in  syntax or even information may not have been identified and edited on initial review prior to signing this note.

## 2023-07-11 NOTE — PROGRESS NOTES
Results released to patient portal.    The 10-year ASCVD risk score (Yamilka DENNIS, et al., 2019) is: 6%    Values used to calculate the score:      Age: 47 years      Sex: Male      Is Non- : No      Diabetic: No      Tobacco smoker: Yes      Systolic Blood Pressure: 130 mmHg      Is BP treated: No      HDL Cholesterol: 68 mg/dL      Total Cholesterol: 262 mg/dL       Your results have been reviewed.  The A1c is stable  in the pre-diabetes range. It is important to eat a healthy diet and exercise. We will continue to monitor this lab. A good resource for additional information is the American Diabetes Association's website: https://www.diabetes.org/diabetes-risk/prediabetes  Your cholesterol is elevated. Therefore, I recommend starting a low cholesterol diet - you do not need a medication at this time. Cut down on fried foods, shrimp, and fast food. Start exercising at least five days a week for >30 minutes. I will include a link to the American Heart Association's website for more information. We will recheck your cholesterol in six months to monitor for improvement- please fast for 10 hours prior to the test.   [email or web address]_[email or web address]  Vitamin D is low. I recommend over-the-counter vitamin D3 2,000 IU daily.   Electrolytes, blood count, liver and kidney function within acceptable range. Normal thyroid hormone. The prostate marker (PSA) is in normal range.   Please call if you have any questions or concerns.    Sincerely,   Dr. Lozano

## 2023-10-02 NOTE — TELEPHONE ENCOUNTER
----- Message from Edwige Kong sent at 10/2/2023 11:46 AM CDT -----  Type:  Sooner Apoointment Request    Caller is requesting a sooner appointment.  Caller declined first available appointment listed below.  Caller will not accept being placed on the waitlist and is requesting a message be sent to doctor.  Name of Caller: Patient  When is the first available appointment? N/A  Symptoms: Right hand numb  Would the patient rather a call back or a response via [facility]?  Call back  Best Call Back Number: [phone]  Additional Information:

## 2023-10-02 NOTE — TELEPHONE ENCOUNTER
C/o Rt. Hand numbness, constant that started last Tuesday.  When he tries to grab something sometimes it just falls on the floor.    Please advise.

## 2023-10-03 NOTE — TELEPHONE ENCOUNTER
Pt needs to go to ED to rule out stroke asap. Can schedule f/u apt following that to evaluate in clinic.

## 2024-01-01 ENCOUNTER — OFFICE VISIT (OUTPATIENT)
Dept: PAIN MEDICINE | Facility: CLINIC | Age: 49
End: 2024-01-01
Payer: COMMERCIAL

## 2024-01-01 ENCOUNTER — TELEPHONE (OUTPATIENT)
Dept: PAIN MEDICINE | Facility: CLINIC | Age: 49
End: 2024-01-01
Payer: COMMERCIAL

## 2024-01-01 ENCOUNTER — OFFICE VISIT (OUTPATIENT)
Dept: FAMILY MEDICINE | Facility: CLINIC | Age: 49
End: 2024-01-01
Payer: COMMERCIAL

## 2024-01-01 VITALS
HEIGHT: 78 IN | OXYGEN SATURATION: 98 % | SYSTOLIC BLOOD PRESSURE: 138 MMHG | BODY MASS INDEX: 36.45 KG/M2 | RESPIRATION RATE: 15 BRPM | TEMPERATURE: 97 F | WEIGHT: 315 LBS | HEART RATE: 82 BPM | DIASTOLIC BLOOD PRESSURE: 84 MMHG

## 2024-01-01 VITALS
OXYGEN SATURATION: 97 % | DIASTOLIC BLOOD PRESSURE: 84 MMHG | HEIGHT: 78 IN | HEART RATE: 97 BPM | SYSTOLIC BLOOD PRESSURE: 141 MMHG | BODY MASS INDEX: 36.45 KG/M2 | WEIGHT: 315 LBS

## 2024-01-01 DIAGNOSIS — M25.562 CHRONIC PAIN OF LEFT KNEE: ICD-10-CM

## 2024-01-01 DIAGNOSIS — E55.9 VITAMIN D DEFICIENCY: ICD-10-CM

## 2024-01-01 DIAGNOSIS — M22.2X2 PATELLOFEMORAL PAIN SYNDROME OF BOTH KNEES: Primary | ICD-10-CM

## 2024-01-01 DIAGNOSIS — G57.11 MERALGIA PARAESTHETICA, RIGHT: ICD-10-CM

## 2024-01-01 DIAGNOSIS — G89.29 CHRONIC BILATERAL LOW BACK PAIN WITHOUT SCIATICA: ICD-10-CM

## 2024-01-01 DIAGNOSIS — E78.5 HYPERLIPIDEMIA, UNSPECIFIED HYPERLIPIDEMIA TYPE: ICD-10-CM

## 2024-01-01 DIAGNOSIS — G89.29 CHRONIC BILATERAL LOW BACK PAIN WITH RIGHT-SIDED SCIATICA: ICD-10-CM

## 2024-01-01 DIAGNOSIS — M22.2X1 PATELLOFEMORAL PAIN SYNDROME OF BOTH KNEES: Primary | ICD-10-CM

## 2024-01-01 DIAGNOSIS — R20.0 NUMBNESS OF RIGHT ANTERIOR THIGH: ICD-10-CM

## 2024-01-01 DIAGNOSIS — M25.561 CHRONIC PAIN OF BOTH KNEES: ICD-10-CM

## 2024-01-01 DIAGNOSIS — G89.29 CHRONIC PAIN OF BOTH KNEES: ICD-10-CM

## 2024-01-01 DIAGNOSIS — M54.41 CHRONIC BILATERAL LOW BACK PAIN WITH RIGHT-SIDED SCIATICA: ICD-10-CM

## 2024-01-01 DIAGNOSIS — M25.562 CHRONIC PAIN OF BOTH KNEES: ICD-10-CM

## 2024-01-01 DIAGNOSIS — G89.29 CHRONIC PAIN OF LEFT KNEE: ICD-10-CM

## 2024-01-01 DIAGNOSIS — R73.03 PREDIABETES: Primary | ICD-10-CM

## 2024-01-01 DIAGNOSIS — M54.50 CHRONIC BILATERAL LOW BACK PAIN WITHOUT SCIATICA: ICD-10-CM

## 2024-01-01 LAB
25(OH)D3 SERPL-MCNC: 15 NG/ML
ALBUMIN SERPL BCP-MCNC: 3.9 G/DL (ref 3.4–5)
ALP SERPL-CCNC: 63 U/L (ref 45–117)
ALT SERPL W P-5'-P-CCNC: 23 U/L (ref 16–61)
ANION GAP SERPL CALC-SCNC: 2 MMOL/L (ref 3–11)
AST SERPL-CCNC: 18 U/L (ref 15–37)
BILIRUB SERPL-MCNC: 0.5 MG/DL (ref 0.2–1)
BUN SERPL-MCNC: 14 MG/DL (ref 7–18)
BUN/CREAT SERPL: 13.33 RATIO
CALCIUM SERPL-MCNC: 9.3 MG/DL (ref 8.5–10.1)
CHLORIDE SERPL-SCNC: 104 MMOL/L (ref 98–107)
CHOLEST SERPL-MSCNC: 257 MG/DL
CO2 SERPL-SCNC: 28 MMOL/L (ref 21–32)
CREAT SERPL-MCNC: 1.05 MG/DL (ref 0.7–1.3)
ESTIMATED AVG GLUCOSE: 129 MG/DL
GFR ESTIMATION: > 60
GLUCOSE SERPL-MCNC: 76 MG/DL (ref 74–106)
HBA1C MFR BLD: 5.8 % (ref 4.2–6.3)
HDLC SERPL-MCNC: 49 MG/DL
LDLC SERPL CALC-MCNC: 186.4 MG/DL
POTASSIUM SERPL-SCNC: 4.3 MMOL/L (ref 3.5–5.1)
PROT SERPL-MCNC: 8.6 G/DL (ref 6.4–8.2)
SODIUM BLD-SCNC: 134 MMOL/L (ref 131–143)
TRIGL SERPL-MCNC: 108 MG/DL (ref 0–149)
VLDL CHOLESTEROL: 22 MG/DL

## 2024-01-01 PROCEDURE — 99205 OFFICE O/P NEW HI 60 MIN: CPT | Mod: S$GLB,,, | Performed by: PHYSICAL MEDICINE & REHABILITATION

## 2024-01-01 PROCEDURE — 3008F BODY MASS INDEX DOCD: CPT | Mod: CPTII,S$GLB,, | Performed by: PHYSICAL MEDICINE & REHABILITATION

## 2024-01-01 PROCEDURE — 3008F BODY MASS INDEX DOCD: CPT | Mod: CPTII,S$GLB,, | Performed by: INTERNAL MEDICINE

## 2024-01-01 PROCEDURE — 3079F DIAST BP 80-89 MM HG: CPT | Mod: CPTII,S$GLB,, | Performed by: PHYSICAL MEDICINE & REHABILITATION

## 2024-01-01 PROCEDURE — 99214 OFFICE O/P EST MOD 30 MIN: CPT | Mod: S$GLB,,, | Performed by: INTERNAL MEDICINE

## 2024-01-01 PROCEDURE — 3077F SYST BP >= 140 MM HG: CPT | Mod: CPTII,S$GLB,, | Performed by: PHYSICAL MEDICINE & REHABILITATION

## 2024-01-01 PROCEDURE — 1159F MED LIST DOCD IN RCRD: CPT | Mod: CPTII,S$GLB,, | Performed by: PHYSICAL MEDICINE & REHABILITATION

## 2024-01-01 PROCEDURE — 3079F DIAST BP 80-89 MM HG: CPT | Mod: CPTII,S$GLB,, | Performed by: INTERNAL MEDICINE

## 2024-01-01 PROCEDURE — 1159F MED LIST DOCD IN RCRD: CPT | Mod: CPTII,S$GLB,, | Performed by: INTERNAL MEDICINE

## 2024-01-01 PROCEDURE — 3075F SYST BP GE 130 - 139MM HG: CPT | Mod: CPTII,S$GLB,, | Performed by: INTERNAL MEDICINE

## 2024-01-01 PROCEDURE — 3044F HG A1C LEVEL LT 7.0%: CPT | Mod: CPTII,S$GLB,, | Performed by: PHYSICAL MEDICINE & REHABILITATION

## 2024-01-01 PROCEDURE — 1160F RVW MEDS BY RX/DR IN RCRD: CPT | Mod: CPTII,S$GLB,, | Performed by: INTERNAL MEDICINE

## 2024-01-01 RX ORDER — NAPROXEN SODIUM 220 MG
220 TABLET ORAL
COMMUNITY

## 2024-01-01 RX ORDER — ALBUTEROL SULFATE 90 UG/1
AEROSOL, METERED RESPIRATORY (INHALATION)
COMMUNITY
Start: 2024-01-01

## 2024-01-01 RX ORDER — DULOXETIN HYDROCHLORIDE 30 MG/1
30 CAPSULE, DELAYED RELEASE ORAL DAILY
Qty: 30 CAPSULE | Refills: 11 | Status: SHIPPED | OUTPATIENT
Start: 2024-01-01 | End: 2025-03-05

## 2024-01-01 RX ORDER — PROMETHAZINE HYDROCHLORIDE AND DEXTROMETHORPHAN HYDROBROMIDE 6.25; 15 MG/5ML; MG/5ML
SYRUP ORAL
COMMUNITY
Start: 2024-01-01

## 2024-01-01 RX ORDER — IBUPROFEN 200 MG
400 TABLET ORAL EVERY 6 HOURS PRN
COMMUNITY

## 2024-01-01 RX ORDER — GABAPENTIN 600 MG/1
600 TABLET ORAL 2 TIMES DAILY PRN
Qty: 60 TABLET | Refills: 3 | Status: SHIPPED | OUTPATIENT
Start: 2024-01-01

## 2024-01-01 RX ORDER — ACETAMINOPHEN 500 MG
500 TABLET ORAL EVERY 6 HOURS PRN
COMMUNITY

## 2024-01-10 PROBLEM — R20.0 NUMBNESS OF RIGHT ANTERIOR THIGH: Status: ACTIVE | Noted: 2024-01-01

## 2024-01-10 NOTE — PROGRESS NOTES
"Subjective:       Patient ID: Stacy Figueroa is a 48 y.o. male.    Chief Complaint: Follow-up (Pt. Here for 6mth f/u.  C/o Rt. Thigh numbness and pain/"like fire", getting worse.  C/o Lt. Knee pain.)    HPI    48 y.o. male presents for follow up of chronic medical problems:     preDM: lifestyle managed    HLD: lifestyle managed    Chronic knee pain: generally bilateral but has had significant worsening of left knee pain. Limits daily activities d/t pain.     Right thigh numbness: chronic but has worsened to include pain intermittently. Does not necessarily correlate with back pain flares.     Review of Systems   Constitutional:  Negative for fever.   Respiratory:  Negative for shortness of breath.    Cardiovascular:  Negative for chest pain.   Gastrointestinal:  Negative for abdominal pain.   Genitourinary:  Negative for difficulty urinating.   Musculoskeletal:  Positive for arthralgias, back pain, gait problem and joint swelling.   Skin:  Negative for wound.   Neurological:  Positive for numbness. Negative for syncope.       Objective:        Vitals:    01/10/24 1335   BP: 138/84   BP Location: Left arm   Patient Position: Sitting   BP Method: Large (Manual)   Pulse: 82   Resp: 15   Temp: 97.4 °F (36.3 °C)   TempSrc: Temporal   SpO2: 98%   Weight: (!) 184.9 kg (407 lb 9.6 oz)   Height: 6' 6" (1.981 m)       Body mass index is 47.1 kg/m².    Physical Exam  Constitutional:       General: He is not in acute distress.     Appearance: He is well-developed.   HENT:      Head: Normocephalic and atraumatic.      Right Ear: External ear normal.      Left Ear: External ear normal.   Cardiovascular:      Rate and Rhythm: Normal rate.   Pulmonary:      Effort: Pulmonary effort is normal.   Musculoskeletal:      Right hip: No tenderness or bony tenderness. Normal range of motion.      Right upper leg: No deformity or tenderness.      Left knee: Decreased range of motion. Tenderness present.   Skin:     General: Skin is warm " and dry.   Neurological:      Mental Status: He is alert.   Psychiatric:         Behavior: Behavior normal.         Assessment:     1. Prediabetes    2. Hyperlipidemia, unspecified hyperlipidemia type    3. Vitamin D deficiency    4. Chronic bilateral low back pain with right-sided sciatica    5. Chronic pain of left knee    6. Numbness of right anterior thigh           Plan:         1. Prediabetes    - Comprehensive Metabolic Panel; Future  - Hemoglobin A1C; Future  - Hemoglobin A1C  - Comprehensive Metabolic Panel    2. Hyperlipidemia, unspecified hyperlipidemia type    - Comprehensive Metabolic Panel; Future  - Lipid Panel; Future  - Lipid Panel  - Comprehensive Metabolic Panel    3. Vitamin D deficiency    - Comprehensive Metabolic Panel; Future  - Vitamin D  - Comprehensive Metabolic Panel    4. Chronic bilateral low back pain with right-sided sciatica    - X-Ray Lumbar Spine 5 View; Future  - Ambulatory referral/consult to Pain Clinic; Future  - gabapentin (NEURONTIN) 600 MG tablet; Take 1 tablet (600 mg total) by mouth 2 (two) times daily as needed (pain).  Dispense: 60 tablet; Refill: 3    5. Chronic pain of left knee    - X-Ray Knee Complete 4 or More Views Left; Future  - Ambulatory referral/consult to Pain Clinic; Future    6. Numbness of right anterior thigh  -DDX: meralgia paresthetica vs IT band syndrome vs lumbar radiculopathy  - info added to AVS and d/w pt  - X-Ray Hip 2 or 3 views Right (with Pelvis when performed); Future  - Ambulatory referral/consult to Pain Clinic; Future          Unless there are intervening problems, Follow up in about 6 months (around 7/10/2024), or if symptoms worsen or fail to improve, for annual, follow up.      Patient note was created using MModal Dictation.  Any errors in syntax or even information may not have been identified and edited on initial review prior to signing this note.    I spent a total of 32 minutes on the day of the visit.  This includes face to face  time and non-face to face time preparing to see the patient (eg, review of tests), obtaining and/or reviewing separately obtained history, documenting clinical information in the electronic or other health record, independently interpreting results and communicating results to the patient/family/caregiver, or care coordinator.

## 2024-01-18 NOTE — PROGRESS NOTES
Please fax referral to pain management that was ordered at clinic apt. I don't think it was received through epic.  _____________    The 10-year ASCVD risk score (Yamilka DENNIS, et al., 2019) is: 4%    Values used to calculate the score:      Age: 48 years      Sex: Male      Is Non- : No      Diabetic: No      Tobacco smoker: No      Systolic Blood Pressure: 138 mmHg      Is BP treated: No      HDL Cholesterol: 49 mg/dL      Total Cholesterol: 257 mg/dL     ____________          Your results have been reviewed.  Vitamin D is low. I recommend over-the-counter vitamin D3 2,000 IU daily.   Total cholesterol is elevated, but you have a low 10 year ASCVD risk score so medication is not needed at this time. Work on efforts to eat a healthy diet and exercise. We will continue to monitor the cholesterol. I will include a link to the American Heart Association's website for more information.   http://[email or web address]/Conditions/Cholesterol/Cholesterol_[facility]_001089_SubHomePage.jsp  Electrolytes, liver and kidney function within acceptable range.   A1c slightly improved but still in prediabetes range. Will monitor again in 6 months.  X-ray of knee shows significant arthritis. We are going to fax the referral to specialist today, I don't think it went through electronically at your clinic apt. X-ray of hip is normal. No acute findings on x-ray of low back.  Please call if you have any questions or concerns.    Sincerely,   Dr. Lozano
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
## 2024-04-02 ENCOUNTER — TELEPHONE (OUTPATIENT)
Dept: FAMILY MEDICINE | Facility: CLINIC | Age: 49
End: 2024-04-02
Payer: COMMERCIAL

## 2024-04-02 NOTE — TELEPHONE ENCOUNTER
----- Message from Haylee Preston sent at 4/2/2024  1:14 PM CDT -----  Contact: Sandee (spouse)  Sandee (spouse) is calling to let clinic know her  passed away Easter Sunday - upcoming appts need to be canceled .